# Patient Record
Sex: MALE | Race: WHITE | Employment: PART TIME | URBAN - METROPOLITAN AREA
[De-identification: names, ages, dates, MRNs, and addresses within clinical notes are randomized per-mention and may not be internally consistent; named-entity substitution may affect disease eponyms.]

---

## 2020-11-03 PROBLEM — J18.9 PNEUMONIA: Status: RESOLVED | Noted: 2020-11-03 | Resolved: 2020-11-03

## 2024-06-25 ENCOUNTER — OFFICE VISIT (OUTPATIENT)
Dept: PULMONOLOGY | Age: 34
End: 2024-06-25
Payer: COMMERCIAL

## 2024-06-25 VITALS
HEART RATE: 81 BPM | OXYGEN SATURATION: 96 % | DIASTOLIC BLOOD PRESSURE: 72 MMHG | SYSTOLIC BLOOD PRESSURE: 118 MMHG | BODY MASS INDEX: 32.19 KG/M2 | WEIGHT: 218 LBS

## 2024-06-25 DIAGNOSIS — E66.9 OBESITY (BMI 30-39.9): ICD-10-CM

## 2024-06-25 DIAGNOSIS — G47.30 SLEEP APNEA, UNSPECIFIED TYPE: Primary | ICD-10-CM

## 2024-06-25 PROCEDURE — 99204 OFFICE O/P NEW MOD 45 MIN: CPT | Performed by: INTERNAL MEDICINE

## 2024-06-25 RX ORDER — FLUTICASONE PROPIONATE 50 MCG
2 SPRAY, SUSPENSION (ML) NASAL
COMMUNITY
Start: 2014-07-09

## 2024-06-25 ASSESSMENT — ENCOUNTER SYMPTOMS
RHINORRHEA: 0
VOICE CHANGE: 0
COUGH: 0
ABDOMINAL PAIN: 0
WHEEZING: 0
VOMITING: 0
EYE ITCHING: 0
SORE THROAT: 0
NAUSEA: 0
DIARRHEA: 0
SHORTNESS OF BREATH: 0
CHEST TIGHTNESS: 0

## 2024-06-25 NOTE — PROGRESS NOTES
Subjective:             Iker Casarez is a 33 y.o. male who complains today of:     Chief Complaint   Patient presents with    New Patient     Ref by PCP Jamil to r/o MALLORY       HPI  He had 2 home sleep study 3  yrs ago , he said  1 st study  was failure and 2 nd study  did not have  sleep apnea requiring CPAP use , he sleep 6-8 hours .  He is complaining of snoring and daytime sleepiness and tiredness.  No C/o witness apnea as per parent.  He wakes up with gasping for air.   C/o wakes up frequently during sleep .   No complaint of morning headache.  He does not have restful sleep week days and rest full  during weekend .  He does not take daily naps. He said he fall a sleep during meeting   He does not fall asleep while watching TV.  He does not have a complaint of sleepiness while driving  He does not have difficulty falling sleep or staying asleep  No significant Weight gain in last 6-12 month     No sleep walking or eating. No sleep onset hallucination. No sleep paralysis No sleep attacks,  He has no episode of sudden weakness with laughter, anger or surprises      Allergies:  Molds & smuts and Other  Past Medical History:   Diagnosis Date    Allergic rhinitis     Asthma     Pneumonia      No past surgical history on file.  No family history on file.  Social History     Socioeconomic History    Marital status: Single     Spouse name: Not on file    Number of children: Not on file    Years of education: Not on file    Highest education level: Not on file   Occupational History    Not on file   Tobacco Use    Smoking status: Never    Smokeless tobacco: Never   Substance and Sexual Activity    Alcohol use: No    Drug use: No    Sexual activity: Not on file   Other Topics Concern    Not on file   Social History Narrative    Not on file     Social Determinants of Health     Financial Resource Strain: Not on file   Food Insecurity: Not on file   Transportation Needs: Not on file   Physical Activity: Not on file

## 2025-04-30 ENCOUNTER — TELEMEDICINE (OUTPATIENT)
Age: 35
End: 2025-04-30
Payer: COMMERCIAL

## 2025-04-30 DIAGNOSIS — E66.9 OBESITY (BMI 30-39.9): ICD-10-CM

## 2025-04-30 DIAGNOSIS — G47.30 SLEEP APNEA, UNSPECIFIED TYPE: Primary | ICD-10-CM

## 2025-04-30 PROCEDURE — 99214 OFFICE O/P EST MOD 30 MIN: CPT | Performed by: INTERNAL MEDICINE

## 2025-04-30 ASSESSMENT — ENCOUNTER SYMPTOMS
CHEST TIGHTNESS: 0
SHORTNESS OF BREATH: 0
ABDOMINAL PAIN: 0
EYE ITCHING: 0
WHEEZING: 0
NAUSEA: 0
SORE THROAT: 0
RHINORRHEA: 0
DIARRHEA: 0
COUGH: 0
VOMITING: 0
VOICE CHANGE: 0

## 2025-04-30 NOTE — PROGRESS NOTES
Iker Casarez was evaluated through a synchronous (real-time) audio encounter. Patient identification was verified at the start of the visit. He (or guardian if applicable) is aware that this is a billable service, which includes applicable co-pays. This visit was conducted with the patient's (and/or legal guardian's) verbal consent. He has not had a related appointment within my department in the past 7 days or scheduled within the next 24 hours.   The patient was located at Home: [address] [place] [address].  The provider was located at Facility (Appt Dept): [address] [place] [address].  Confirm you are appropriately licensed, registered, or certified to deliver care in the state where the patient is located as indicated above. If you are not or unsure, please re-schedule the visit: Yes, I confirm.     Subjective       HPI  Iker Casarez  is a 34 y.o. male evaluated via telephone on 4/30/2025 for Follow-up (F/u on MALLORY)  .    He had 2 home sleep study 3  yrs ago , he said 1st study was failure and 2nd study did not have  sleep apnea requiring CPAP use , he sleep 6-8 hours. Patient said his family think his  apnea is worse.     He is complaining of snoring and daytime sleepiness and tiredness.  C/o witness apnea as per family .  He does not wakes up with gasping for air. C/o wakes up frequently during sleep .   No complaint of morning headache. He does not have restful sleep half of the time in a week .  He does not take daily naps.He does  fall asleep while watching TV.  He does not have a complaint of sleepiness while driving      Current Outpatient Medications on File Prior to Visit   Medication Sig Dispense Refill    fluticasone (FLONASE) 50 MCG/ACT nasal spray 2 sprays by NOT APPLICABLE route At bedtime as needed      albuterol (PROVENTIL HFA;VENTOLIN HFA) 108 (90 BASE) MCG/ACT inhaler Inhale 2 puffs into the lungs every 6 hours as needed. 1 Inhaler 6    Cetirizine HCl

## 2025-05-27 ENCOUNTER — HOSPITAL ENCOUNTER (OUTPATIENT)
Dept: SLEEP CENTER | Age: 35
Discharge: HOME | End: 2025-05-29
Payer: COMMERCIAL

## 2025-05-27 DIAGNOSIS — G47.30 SLEEP APNEA, UNSPECIFIED TYPE: ICD-10-CM

## 2025-05-27 PROCEDURE — 95810 POLYSOM 6/> YRS 4/> PARAM: CPT

## 2025-06-08 PROBLEM — G47.30 SLEEP APNEA: Status: ACTIVE | Noted: 2025-06-08

## 2025-06-09 ENCOUNTER — OFFICE VISIT (OUTPATIENT)
Age: 35
End: 2025-06-09
Payer: COMMERCIAL

## 2025-06-09 VITALS
WEIGHT: 216 LBS | BODY MASS INDEX: 31.9 KG/M2 | HEART RATE: 82 BPM | SYSTOLIC BLOOD PRESSURE: 110 MMHG | OXYGEN SATURATION: 96 % | DIASTOLIC BLOOD PRESSURE: 74 MMHG

## 2025-06-09 DIAGNOSIS — E66.9 OBESITY (BMI 30-39.9): ICD-10-CM

## 2025-06-09 DIAGNOSIS — G47.33 OSA (OBSTRUCTIVE SLEEP APNEA): Primary | ICD-10-CM

## 2025-06-09 PROCEDURE — 99214 OFFICE O/P EST MOD 30 MIN: CPT | Performed by: INTERNAL MEDICINE

## 2025-06-09 ASSESSMENT — ENCOUNTER SYMPTOMS
WHEEZING: 0
SORE THROAT: 0
NAUSEA: 0
ABDOMINAL PAIN: 0
SHORTNESS OF BREATH: 0
APNEA: 1
VOMITING: 0
DIARRHEA: 0
CHEST TIGHTNESS: 0
COUGH: 0
VOICE CHANGE: 0
RHINORRHEA: 0
EYE ITCHING: 0

## 2025-06-09 NOTE — PROGRESS NOTES
Subjective:             Iker Casarez is a 34 y.o. male who complains today of:     Chief Complaint   Patient presents with    Follow-up     4wk f/u for SS results        HPI  He had sleep study done . He came for f/u     PSG done 5/27/25  AHI 10.3 RDI  11.7   He is complaining of snoring and daytime sleepiness and tiredness.  C/o witness apnea as per family.  He does not wakes up with gasping for air.   C/o wakes up frequently during sleep .   No complaint of morning headache.   He does not have restful sleep half of the time in a week .  He does not take daily naps.  He does  fall asleep while watching TV.  He does Occasional  have a complaint of sleepiness while driving.    He want to try auto CPAP instead going to sleep lab    Allergies:  Molds & smuts and Other/food  Past Medical History:   Diagnosis Date    Allergic rhinitis     Asthma     Pneumonia      No past surgical history on file.  No family history on file.  Social History     Socioeconomic History    Marital status: Single     Spouse name: Not on file    Number of children: Not on file    Years of education: Not on file    Highest education level: Not on file   Occupational History    Not on file   Tobacco Use    Smoking status: Never    Smokeless tobacco: Never   Substance and Sexual Activity    Alcohol use: No    Drug use: No    Sexual activity: Not on file   Other Topics Concern    Not on file   Social History Narrative    Not on file     Social Drivers of Health     Financial Resource Strain: Not on file   Food Insecurity: Unknown (1/18/2024)    Received from [facility], [facility]    Food Insecurities     Worried about running out of food: Not on file     Food Bought: Not on file   Transportation Needs: Unknown (1/18/2024)    Received from [facility] [employer], [facility] and [facility]    Transportation     Worried about transportation: Not on

## 2025-06-26 ENCOUNTER — TELEPHONE (OUTPATIENT)
Age: 35
End: 2025-06-26

## 2025-08-15 ENCOUNTER — TELEMEDICINE (OUTPATIENT)
Age: 35
End: 2025-08-15
Payer: COMMERCIAL

## 2025-08-15 DIAGNOSIS — E66.9 OBESITY (BMI 30-39.9): ICD-10-CM

## 2025-08-15 DIAGNOSIS — G47.33 OSA (OBSTRUCTIVE SLEEP APNEA): Primary | ICD-10-CM

## 2025-08-15 DIAGNOSIS — R04.0 EPISTAXIS: ICD-10-CM

## 2025-08-15 PROCEDURE — 99214 OFFICE O/P EST MOD 30 MIN: CPT | Performed by: INTERNAL MEDICINE

## 2025-08-20 ASSESSMENT — ENCOUNTER SYMPTOMS
EYE ITCHING: 0
SHORTNESS OF BREATH: 0
CHEST TIGHTNESS: 0
NAUSEA: 0
SORE THROAT: 0
VOICE CHANGE: 0
COUGH: 0
DIARRHEA: 0
RHINORRHEA: 0
ABDOMINAL PAIN: 0
VOMITING: 0
WHEEZING: 0